# Patient Record
Sex: FEMALE | Race: BLACK OR AFRICAN AMERICAN | NOT HISPANIC OR LATINO | Employment: FULL TIME | URBAN - METROPOLITAN AREA
[De-identification: names, ages, dates, MRNs, and addresses within clinical notes are randomized per-mention and may not be internally consistent; named-entity substitution may affect disease eponyms.]

---

## 2023-11-19 DIAGNOSIS — F41.9 ANXIETY: ICD-10-CM

## 2023-11-22 RX ORDER — PAROXETINE HYDROCHLORIDE 40 MG/1
40 TABLET, FILM COATED ORAL DAILY
Qty: 30 TABLET | Refills: 0 | Status: SHIPPED | OUTPATIENT
Start: 2023-11-22

## 2024-08-20 ENCOUNTER — OFFICE VISIT (OUTPATIENT)
Dept: PRIMARY CARE | Facility: CLINIC | Age: 54
End: 2024-08-20

## 2024-08-20 VITALS
BODY MASS INDEX: 41.86 KG/M2 | DIASTOLIC BLOOD PRESSURE: 73 MMHG | HEART RATE: 93 BPM | OXYGEN SATURATION: 97 % | SYSTOLIC BLOOD PRESSURE: 129 MMHG | WEIGHT: 245.2 LBS | HEIGHT: 64 IN

## 2024-08-20 DIAGNOSIS — Z79.4 TYPE 2 DIABETES MELLITUS WITHOUT COMPLICATION, WITH LONG-TERM CURRENT USE OF INSULIN (MULTI): Primary | ICD-10-CM

## 2024-08-20 DIAGNOSIS — E11.9 TYPE 2 DIABETES MELLITUS WITHOUT COMPLICATION, WITH LONG-TERM CURRENT USE OF INSULIN (MULTI): Primary | ICD-10-CM

## 2024-08-20 DIAGNOSIS — E78.2 MIXED HYPERLIPIDEMIA: ICD-10-CM

## 2024-08-20 PROCEDURE — 3074F SYST BP LT 130 MM HG: CPT | Performed by: STUDENT IN AN ORGANIZED HEALTH CARE EDUCATION/TRAINING PROGRAM

## 2024-08-20 PROCEDURE — 99214 OFFICE O/P EST MOD 30 MIN: CPT | Performed by: STUDENT IN AN ORGANIZED HEALTH CARE EDUCATION/TRAINING PROGRAM

## 2024-08-20 PROCEDURE — 1036F TOBACCO NON-USER: CPT | Performed by: STUDENT IN AN ORGANIZED HEALTH CARE EDUCATION/TRAINING PROGRAM

## 2024-08-20 PROCEDURE — 3078F DIAST BP <80 MM HG: CPT | Performed by: STUDENT IN AN ORGANIZED HEALTH CARE EDUCATION/TRAINING PROGRAM

## 2024-08-20 PROCEDURE — 3008F BODY MASS INDEX DOCD: CPT | Performed by: STUDENT IN AN ORGANIZED HEALTH CARE EDUCATION/TRAINING PROGRAM

## 2024-08-20 RX ORDER — INSULIN ASPART 100 [IU]/ML
30 INJECTION, SUSPENSION SUBCUTANEOUS EVERY 12 HOURS
COMMUNITY
Start: 2024-07-05 | End: 2024-08-20 | Stop reason: SDUPTHER

## 2024-08-20 RX ORDER — INSULIN LISPRO 100 [IU]/ML
30 INJECTION, SUSPENSION SUBCUTANEOUS
COMMUNITY
Start: 2020-06-15

## 2024-08-20 RX ORDER — INSULIN ASPART 100 [IU]/ML
30 INJECTION, SUSPENSION SUBCUTANEOUS EVERY 12 HOURS
Qty: 30 ML | Refills: 0 | Status: SHIPPED | OUTPATIENT
Start: 2024-08-20

## 2024-08-20 RX ORDER — OMEPRAZOLE 20 MG/1
20 CAPSULE, DELAYED RELEASE ORAL DAILY
COMMUNITY

## 2024-08-20 RX ORDER — PRAVASTATIN SODIUM 40 MG/1
40 TABLET ORAL DAILY
COMMUNITY

## 2024-08-20 RX ORDER — AMLODIPINE BESYLATE 10 MG/1
10 TABLET ORAL
COMMUNITY
Start: 2023-11-20

## 2024-08-20 RX ORDER — GLIPIZIDE 10 MG/1
10 TABLET, FILM COATED, EXTENDED RELEASE ORAL 2 TIMES DAILY
COMMUNITY
Start: 2023-10-18

## 2024-08-20 NOTE — PROGRESS NOTES
"Subjective   Patient ID: Kenzie Casas is a 53 y.o. female who presents for Follow-up (1 1/2 year follow-up.).        HPI      Last seen 2 year ago  ( 9/2022), due to change of insurance     Last A1c is 10.1 in June 2024  Was not taking insulin \" insurance dropped me \" for a month         Visit Vitals  /73   Pulse 93   Ht 1.626 m (5' 4\")   Wt 111 kg (245 lb 3.2 oz)   SpO2 97%   BMI 42.09 kg/m²   Smoking Status Never   BSA 2.24 m²      No LMP recorded.   Current Outpatient Medications   Medication Instructions    amLODIPine (NORVASC) 10 mg, oral, Daily RT    glipiZIDE XL (GLUCOTROL XL) 10 mg, oral, 2 times daily    HumaLOG Mix 75-25,U-100,Insuln 100 unit/mL (75-25) suspension injection 30 Units, subcutaneous    insulin asp prt-insulin aspart (NovoLOG Mix 70-30) 100 unit/mL (70-30) injection 30 Units, subcutaneous, Every 12 hours    omeprazole (PRILOSEC) 20 mg, oral, Daily    PARoxetine (PAXIL) 40 mg, oral, Daily, -----DUE FOR APPT    pravastatin (PRAVACHOL) 40 mg, oral, Daily    [START ON 8/25/2024] semaglutide (OZEMPIC) 1 mg, subcutaneous, Once Weekly    [START ON 8/25/2024] semaglutide 0.25 mg, subcutaneous, Once Weekly    [START ON 8/25/2024] semaglutide 0.5 mg, subcutaneous, Once Weekly      Social History     Tobacco Use    Smoking status: Never    Smokeless tobacco: Never   Substance Use Topics    Alcohol use: Yes     Comment: Occasionally        Review of Systems    Constitutional : No feeling poorly / fevers/ chills / night sweats/ fatigue   Cardiovascular : No CP /Palpitations/ lower extremity edema / syncope   Respiratory : No Cough /MARIEE/Dyspnea at rest   Gastrointestinal : No abd pain / N/V  No bloody stools/ melena / constipation  Endo : No polyuria/polydipsia/ muscle weakness / sluggishness   CNS: No confusion / HA/ tingling/ numbness/ weakness of extremities  Psychiatric: No anxiety/ depression/ SI/HI    All other systems have been reviewed and are negative for complaint       Physical " Exam    Constitutional : Vitals reviewed. Alert and in no distress  Cardiovascular : RRR, Normal S1, S2, No pericardial rub/ gallop, no peripheral edema   Pulmonary: No respiratory distress, CTAB   MSK : Normal gait and station , strength and tone   Skin: Warm to touch ,  normal skin turgor   Neurologic : CNs 2-12 grossly intact , no obvious FNDs  Psych : A,Ox3, normal mood and affect      Assessment/Plan   Diagnoses and all orders for this visit:  Type 2 diabetes mellitus without complication, with long-term current use of insulin (Multi)  -     semaglutide 0.25 mg or 0.5 mg (2 mg/3 mL) pen injector; Inject 0.25 mg under the skin 1 (one) time per week.  -     semaglutide 0.25 mg or 0.5 mg (2 mg/3 mL) pen injector; Inject 0.5 mg under the skin 1 (one) time per week.  -     semaglutide (OZEMPIC) 1 mg/dose (4 mg/3 mL) pen injector; Inject 1 mg under the skin 1 (one) time per week.  -     Follow Up In [facility] - Pharmacy; Future  -     insulin asp prt-insulin aspart (NovoLOG Mix 70-30) 100 unit/mL (70-30) injection; Inject 30 Units under the skin every 12 hours.  -     Diabetic Supplies Glucometer  -     Diabetic Supplies Glucose Monitoring Strips  -     Diabetic Supplies Stylets For Fingerstick  Mixed hyperlipidemia  -     Lipid panel; Future  -     TSH with reflex to Free T4 if abnormal; Future    50 y/o female with h/o bariatric surgery in 2014, breast reduction mammoplasty in 2003 , vitiligo , DM2, HTN, HPL., anemia       HTN: at goal   HPL : on statin   DM2: not at goal   Start Ozempic   Continue insulin at the current dose, will reduce the dose as needed as A1c improves   Rpt A1c in Sept     rTO in 1m   Labs to be done prior to the visit               Conditions addressed and mgmt as noted above.  Pertinent labs, images/ imaging reports , chart review was done .   Age appropriate labs / labs for mgmt of chronic medical conditions ordered, further mgmt pending the results.       This note is intended  for the physician writing it, as well as to communicate findings to other healthcare professionals. These notes use medical lexicon that may be misunderstood by non medical persons. Therefore, interpretations of medical notes and terminology should be approached with caution.

## 2024-08-26 ENCOUNTER — APPOINTMENT (OUTPATIENT)
Dept: OBSTETRICS AND GYNECOLOGY | Facility: CLINIC | Age: 54
End: 2024-08-26

## 2024-09-11 ENCOUNTER — APPOINTMENT (OUTPATIENT)
Dept: PRIMARY CARE | Facility: CLINIC | Age: 54
End: 2024-09-11

## 2024-09-12 ENCOUNTER — APPOINTMENT (OUTPATIENT)
Dept: PHARMACY | Facility: HOSPITAL | Age: 54
End: 2024-09-12

## 2024-09-12 DIAGNOSIS — E11.9 TYPE 2 DIABETES MELLITUS WITHOUT COMPLICATION, WITH LONG-TERM CURRENT USE OF INSULIN (MULTI): ICD-10-CM

## 2024-09-12 DIAGNOSIS — Z79.4 TYPE 2 DIABETES MELLITUS WITHOUT COMPLICATION, WITH LONG-TERM CURRENT USE OF INSULIN (MULTI): ICD-10-CM

## 2024-09-12 NOTE — PROGRESS NOTES
Clinical Pharmacy Appointment    Patient ID: Kenzie Casas is a 53 y.o. female who presents for Diabetes.    Pt is here for First appointment.     Referring Provider: Mary Currie,*  PCP: Mary Currie MD   Last visit with PCP: 8/20/24   Next visit with PCP: ACE    Subjective     HPI  DIABETES MELLITUS TYPE 2:    Diagnosed with diabetes: 15 years ago. Known diabetic complications: hyperglycemia.  Does patient follow with Endocrinology: No  Last optometry exam: October 2023  Most recent visit in Podiatry: N/A-- patient denies sores or cuts on feet today      Current diabetic medications include:  Novolog 70/30 mix 30 units every 12 hours  Glipizide XL 10 mg twice daily    Clarifications to above regimen: N/A  Adverse Effects: None    Past diabetic medications include:  Metformin (itching/diarrhea) 7870-9140   Jardiance 25 mg daily [id number] ([facility] did not renew)    Glucose Readings:  Glucometer/CGM Type: None  Patient tests BG 0 times per day    Current home BG readings: N/A   Previous home BG readings: N/A    Any episodes of hypoglycemia? No.    Did patient treat episode of hypoglycemia appropriately? N/A  Does the patient have a prescription for ready-to-use Glucagon? No  Does pt have proteinuria? No    Lifestyle:  Diet: Salad with turkey. Soup. Various vegetables. No red meat.  Snacks: No junk food or sweets  Drinks: Water    Physical Activity: walking, active job    Secondary Prevention:  Statin? Yes  ACE-I/ARB? No  Aspirin? No    Pertinent PMH Review:  PMH of Pancreatitis: No  PMH of Retinopathy: No  PMH of Urinary Tract Infections: No  PMH of MTC: No     Drug Interactions  No relevant drug interactions were noted.    Medication System Management  Patient's preferred pharmacy: [facility]  Adherence/Organization: Insurance lapse in June/July caused a lapse in insulin refills.  Affordability/Accessibility: N/A      Objective   Allergies   Allergen Reactions    Lisinopril Itching and  Cough    Metformin Diarrhea and Itching     Social History     Social History Narrative    Not on file      Medication Review  Current Outpatient Medications   Medication Instructions    amLODIPine (NORVASC) 10 mg, oral, Daily RT    Blood glucose monitoring meter kit (True Metrix Glucose Meter) kit Check blood sugars up to three times daily    blood sugar diagnostic (True Metrix Glucose Test Strip) strip Check blood sugars up to three times daily    glipiZIDE XL (GLUCOTROL XL) 10 mg, oral, 2 times daily    HumaLOG Mix 75-25,U-100,Insuln 100 unit/mL (75-25) suspension injection 30 Units, subcutaneous    insulin asp prt-insulin aspart (NovoLOG Mix 70-30) 100 unit/mL (70-30) injection 30 Units, subcutaneous, Every 12 hours    lancets (OneTouch Delica Plus Lancet) 33 gauge misc Check blood sugars up to three times daily    omeprazole (PRILOSEC) 20 mg, oral, Daily    PARoxetine (PAXIL) 40 mg, oral, Daily, -----DUE FOR APPT    pravastatin (PRAVACHOL) 40 mg, oral, Daily    semaglutide (OZEMPIC) 1 mg, subcutaneous, Once Weekly    semaglutide 0.25 mg, subcutaneous, Once Weekly    semaglutide 0.5 mg, subcutaneous, Once Weekly      Vitals  BP Readings from Last 2 Encounters:   08/20/24 129/73   09/29/22 124/75     BMI Readings from Last 1 Encounters:   08/20/24 42.09 kg/m²      Labs  A1C  Lab Results   Component Value Date    HGBA1C 10.1 (A) 06/27/2024    HGBA1C 9.7 (H) 05/15/2023    HGBA1C 8.7 (A) 09/29/2022     BMP  Lab Results   Component Value Date    CALCIUM 9.0 09/29/2022     09/29/2022    K 4.0 09/29/2022    CO2 27 09/29/2022     09/29/2022    BUN 8 09/29/2022    CREATININE 0.79 09/29/2022    EGFR 89 08/24/2022     LFTs  Lab Results   Component Value Date    ALT 26 08/24/2022    AST 35 08/24/2022    ALKPHOS 127 (H) 08/24/2022    BILITOT 0.4 08/24/2022     FLP  Lab Results   Component Value Date    TRIG 72 05/04/2022    CHOL 169 05/04/2022    LDLF 103 (H) 05/04/2022    HDL 51.3 05/04/2022     Urine  Microalbumin  Lab Results   Component Value Date    MICROALBCREA SEE COMMENT 05/04/2022     Weight Management  Wt Readings from Last 3 Encounters:   08/20/24 111 kg (245 lb 3.2 oz)   09/29/22 111 kg (244 lb 6 oz)   05/18/22 107 kg (236 lb 6 oz)      There is no height or weight on file to calculate BMI.     Assessment/Plan   Problem List Items Addressed This Visit       Type 2 diabetes mellitus without complication, with long-term current use of insulin (Multi)     Patient's goal A1c is < 7%.  Is pt at goal? No, most recent A1C was 10.1%  Patient's SMBGs are unknown.     CONTINUE:  Novolog 70/30 mix 30 units every 12 hours  Glilpizide XL 10 mg twice daily  START  Ozempic 0.25 mg weekly - has not started due to needing PA. I will work on this.    Future Considerations: Ozempic titration    Monitoring and Education: Ozempic administration and possible adverse effects.         Relevant Medications    blood sugar diagnostic (True Metrix Glucose Test Strip) strip    Blood glucose monitoring meter kit (True Metrix Glucose Meter) kit    lancets (OneTouch Delica Plus Lancet) 33 gauge misc    Other Relevant Orders    Follow Up In [facility] - Pharmacy     Clinical Pharmacist follow-up: when Ozempic PA is approved, Telehealth visit    Continue all meds under the continuation of care with the referring provider and clinical pharmacy team.    Thank you,  Ness Ward  Clinical Pharmacist  [phone]    Verbal consent to manage patient's drug therapy was obtained from the patient. They were informed they may decline to participate or withdraw from participation in pharmacy services at any time.

## 2024-09-13 PROBLEM — E11.9 TYPE 2 DIABETES MELLITUS WITHOUT COMPLICATION, WITH LONG-TERM CURRENT USE OF INSULIN (MULTI): Status: ACTIVE | Noted: 2024-09-13

## 2024-09-13 PROBLEM — Z79.4 TYPE 2 DIABETES MELLITUS WITHOUT COMPLICATION, WITH LONG-TERM CURRENT USE OF INSULIN (MULTI): Status: ACTIVE | Noted: 2024-09-13

## 2024-09-13 RX ORDER — INSULIN PUMP SYRINGE, 3 ML
EACH MISCELLANEOUS
Qty: 1 EACH | Refills: 0 | Status: SHIPPED | OUTPATIENT
Start: 2024-09-13 | End: 2025-09-13

## 2024-09-13 RX ORDER — CALCIUM CITRATE/VITAMIN D3 200MG-6.25
TABLET ORAL
Qty: 100 STRIP | Refills: 2 | Status: SHIPPED | OUTPATIENT
Start: 2024-09-13

## 2024-09-13 RX ORDER — LANCETS 33 GAUGE
EACH MISCELLANEOUS
Qty: 100 EACH | Refills: 2 | Status: SHIPPED | OUTPATIENT
Start: 2024-09-13

## 2024-09-13 NOTE — ASSESSMENT & PLAN NOTE
Patient's goal A1c is < 7%.  Is pt at goal? No, most recent A1C was 10.1%  Patient's SMBGs are unknown.     CONTINUE:  Novolog 70/30 mix 30 units every 12 hours  Glilpizide XL 10 mg twice daily  START  Ozempic 0.25 mg weekly - has not started due to needing PA. I will work on this.    Future Considerations: Ozempic titration    Monitoring and Education: Ozempic administration and possible adverse effects.

## 2024-09-19 ENCOUNTER — APPOINTMENT (OUTPATIENT)
Dept: PHARMACY | Facility: HOSPITAL | Age: 54
End: 2024-09-19

## 2024-09-26 ENCOUNTER — TELEPHONE (OUTPATIENT)
Dept: PRIMARY CARE | Facility: CLINIC | Age: 54
End: 2024-09-26

## 2024-11-05 DIAGNOSIS — E11.9 TYPE 2 DIABETES MELLITUS WITHOUT COMPLICATION, WITH LONG-TERM CURRENT USE OF INSULIN (MULTI): Primary | ICD-10-CM

## 2024-11-05 DIAGNOSIS — Z79.4 TYPE 2 DIABETES MELLITUS WITHOUT COMPLICATION, WITH LONG-TERM CURRENT USE OF INSULIN (MULTI): Primary | ICD-10-CM

## 2024-11-14 ENCOUNTER — TELEPHONE (OUTPATIENT)
Dept: PHARMACY | Facility: HOSPITAL | Age: 54
End: 2024-11-14

## 2024-11-14 NOTE — TELEPHONE ENCOUNTER
Patient had been lost to follow-up for about one month. PA had been approved for Ozempic and she has taken 3 doses so far. She will take the 4th dose of 0.25 mg on Saturday. Reviewed titration schedule and she will increase to 0.5 mg next Saturday. She has not been checking her blood sugars due to lack of supplies (insurance does not cover). Discussed again that [facility] has affordable options and patient will purchase supplies this weekend and begin monitoring.     She reports she has felt like her blood sugars are low since starting Ozempic and has decreased her insulin to 10-15 units every 12 hours.    Follow-up scheduled with me for 11/27 to review blood sugars and make adjustments to insulin dose if needed.

## 2024-11-27 ENCOUNTER — APPOINTMENT (OUTPATIENT)
Dept: PHARMACY | Facility: HOSPITAL | Age: 54
End: 2024-11-27

## 2024-12-10 ENCOUNTER — APPOINTMENT (OUTPATIENT)
Dept: PHARMACY | Facility: HOSPITAL | Age: 54
End: 2024-12-10

## 2024-12-17 ENCOUNTER — APPOINTMENT (OUTPATIENT)
Dept: PRIMARY CARE | Facility: CLINIC | Age: 54
End: 2024-12-17

## 2024-12-17 DIAGNOSIS — E11.9 TYPE 2 DIABETES MELLITUS WITHOUT COMPLICATION, WITH LONG-TERM CURRENT USE OF INSULIN (MULTI): ICD-10-CM

## 2024-12-17 DIAGNOSIS — Z79.4 TYPE 2 DIABETES MELLITUS WITHOUT COMPLICATION, WITH LONG-TERM CURRENT USE OF INSULIN (MULTI): ICD-10-CM

## 2024-12-17 RX ORDER — SEMAGLUTIDE 0.68 MG/ML
0.5 INJECTION, SOLUTION SUBCUTANEOUS
Qty: 3 ML | Refills: 0 | Status: SHIPPED | OUTPATIENT
Start: 2024-12-17

## 2025-02-07 DIAGNOSIS — E11.9 TYPE 2 DIABETES MELLITUS WITHOUT COMPLICATION, WITH LONG-TERM CURRENT USE OF INSULIN (MULTI): ICD-10-CM

## 2025-02-07 DIAGNOSIS — I10 HYPERTENSION, UNSPECIFIED TYPE: ICD-10-CM

## 2025-02-07 DIAGNOSIS — Z79.4 TYPE 2 DIABETES MELLITUS WITHOUT COMPLICATION, WITH LONG-TERM CURRENT USE OF INSULIN (MULTI): ICD-10-CM

## 2025-02-07 RX ORDER — INSULIN ASPART 100 [IU]/ML
INJECTION, SUSPENSION SUBCUTANEOUS
Qty: 20 ML | Refills: 0 | Status: SHIPPED | OUTPATIENT
Start: 2025-02-07

## 2025-02-07 RX ORDER — AMLODIPINE BESYLATE 10 MG/1
10 TABLET ORAL
Qty: 30 TABLET | Refills: 0 | Status: SHIPPED | OUTPATIENT
Start: 2025-02-07

## 2025-02-12 ENCOUNTER — APPOINTMENT (OUTPATIENT)
Dept: PRIMARY CARE | Facility: CLINIC | Age: 55
End: 2025-02-12

## 2025-03-02 DIAGNOSIS — I10 HYPERTENSION, UNSPECIFIED TYPE: ICD-10-CM

## 2025-03-03 ENCOUNTER — APPOINTMENT (OUTPATIENT)
Dept: OBSTETRICS AND GYNECOLOGY | Facility: CLINIC | Age: 55
End: 2025-03-03

## 2025-03-03 RX ORDER — AMLODIPINE BESYLATE 10 MG/1
10 TABLET ORAL DAILY
Qty: 30 TABLET | Refills: 0 | Status: SHIPPED | OUTPATIENT
Start: 2025-03-03

## 2025-03-07 ENCOUNTER — TELEPHONE (OUTPATIENT)
Dept: OBSTETRICS AND GYNECOLOGY | Facility: CLINIC | Age: 55
End: 2025-03-07
Payer: MEDICARE

## 2025-03-24 ENCOUNTER — APPOINTMENT (OUTPATIENT)
Dept: OBSTETRICS AND GYNECOLOGY | Facility: CLINIC | Age: 55
End: 2025-03-24
Payer: MEDICARE

## 2025-03-28 ENCOUNTER — APPOINTMENT (OUTPATIENT)
Dept: OBSTETRICS AND GYNECOLOGY | Facility: CLINIC | Age: 55
End: 2025-03-28
Payer: MEDICARE

## 2025-04-15 DIAGNOSIS — I10 HYPERTENSION, UNSPECIFIED TYPE: ICD-10-CM

## 2025-04-15 RX ORDER — AMLODIPINE BESYLATE 10 MG/1
10 TABLET ORAL DAILY
Qty: 23 TABLET | Refills: 1 | OUTPATIENT
Start: 2025-04-15

## 2025-07-26 DIAGNOSIS — I10 HYPERTENSION, UNSPECIFIED TYPE: ICD-10-CM

## 2025-07-26 RX ORDER — AMLODIPINE BESYLATE 10 MG/1
10 TABLET ORAL DAILY
Qty: 23 TABLET | Refills: 1 | OUTPATIENT
Start: 2025-07-26